# Patient Record
Sex: MALE | Race: WHITE | NOT HISPANIC OR LATINO | URBAN - METROPOLITAN AREA
[De-identification: names, ages, dates, MRNs, and addresses within clinical notes are randomized per-mention and may not be internally consistent; named-entity substitution may affect disease eponyms.]

---

## 2021-10-20 ENCOUNTER — OFFICE (OUTPATIENT)
Dept: URBAN - METROPOLITAN AREA CLINIC 34 | Facility: CLINIC | Age: 36
End: 2021-10-20

## 2021-10-20 VITALS
SYSTOLIC BLOOD PRESSURE: 106 MMHG | TEMPERATURE: 97.9 F | WEIGHT: 228 LBS | DIASTOLIC BLOOD PRESSURE: 83 MMHG | HEART RATE: 65 BPM | HEIGHT: 75 IN

## 2021-10-20 DIAGNOSIS — K21.9 GASTRO-ESOPHAGEAL REFLUX DISEASE WITHOUT ESOPHAGITIS: ICD-10-CM

## 2021-10-20 DIAGNOSIS — I34.1 NONRHEUMATIC MITRAL (VALVE) PROLAPSE: ICD-10-CM

## 2021-10-20 DIAGNOSIS — R13.10 DYSPHAGIA, UNSPECIFIED: ICD-10-CM

## 2021-10-20 PROCEDURE — 99204 OFFICE O/P NEW MOD 45 MIN: CPT | Performed by: PHYSICIAN ASSISTANT

## 2021-10-20 NOTE — SERVICEHPINOTES
ADAMS DURON   is a   35   year old white male who is being seen in consultation at the request of   EMY BEAN   for ov prior to EGD. He informs of prior EGDs done in [place] in 2011, 2014,  2016: Requesting records from Dr Kristie Fields in [facility]. His last EGD was in 2016 with esophageal dilation and h/o dysphagia. He reports recurrent dysphagia since end of 2019 and early 2020. He mentions since Winter of 2018 consistent use of rx Omeprazole 40 mg qd that he states has helped about "95%" of his chest discomfort. At this time he notes mild dysphagia, felt over mid sternal region, occurs about once every 2-3 months so avoids dry meats/rice. Denies chest pain, odynophagia, abdominal pain, change in BMs, melena, weight loss.  br
br
br

## 2023-05-30 ENCOUNTER — OFFICE (OUTPATIENT)
Dept: URBAN - METROPOLITAN AREA CLINIC 79 | Facility: CLINIC | Age: 38
End: 2023-05-30

## 2023-05-30 VITALS
WEIGHT: 237 LBS | DIASTOLIC BLOOD PRESSURE: 81 MMHG | SYSTOLIC BLOOD PRESSURE: 121 MMHG | HEIGHT: 75 IN | TEMPERATURE: 97.2 F | HEART RATE: 63 BPM

## 2023-05-30 DIAGNOSIS — R13.10 DYSPHAGIA, UNSPECIFIED: ICD-10-CM

## 2023-05-30 DIAGNOSIS — K21.9 GASTRO-ESOPHAGEAL REFLUX DISEASE WITHOUT ESOPHAGITIS: ICD-10-CM

## 2023-05-30 DIAGNOSIS — I34.1 NONRHEUMATIC MITRAL (VALVE) PROLAPSE: ICD-10-CM

## 2023-05-30 PROCEDURE — 99214 OFFICE O/P EST MOD 30 MIN: CPT | Performed by: PHYSICIAN ASSISTANT

## 2023-05-30 RX ORDER — OMEPRAZOLE 40 MG/1
CAPSULE, DELAYED RELEASE ORAL
Qty: 180 | Refills: 2 | Status: ACTIVE

## 2023-05-30 NOTE — SERVICEHPINOTES
ADAMS DURON   is a   37   year old white male who is being seen in consultation at the request of   EMY BEAN   for EGD w/ dilation. Last seen in 10/2021 for dysphagia and recommendation to have EGD which he did not do. He informs of prior EGDs done in [place] in 2011, 2014, 2016: Requesting records from Dr Kristie Fields in [facility]. His last EGD was in 2016 with esophageal dilation and h/o dysphagia. He reports recurrent dysphagia since end of 2019 and early 2020. He mentions since Winter of 2018 consistent use of rx Omeprazole 40 mg qd that he states has helped about "95%" of his chest discomfort. He recalls initialy mild dysphagia, felt over mid sternal region, occurs about once every 2-3 months but at this time events occur 1-2x/week. He notes "chicken" hard to pass down about 2 weeks ago at a Wedding so avoids dry meats/rice. He has h/o MVP No cardiac surgery. Not followed by cardiologist. Denies chest pain, odynophagia, abdominal pain, change in BMs, melena, weight loss.br

## 2023-06-07 ENCOUNTER — AMBULATORY SURGICAL CENTER (OUTPATIENT)
Dept: URBAN - METROPOLITAN AREA SURGERY 23 | Facility: SURGERY | Age: 38
End: 2023-06-07

## 2023-06-07 DIAGNOSIS — R13.10 DYSPHAGIA, UNSPECIFIED: ICD-10-CM

## 2023-06-07 PROCEDURE — 43450 DILATE ESOPHAGUS 1/MULT PASS: CPT | Performed by: INTERNAL MEDICINE

## 2023-06-07 PROCEDURE — 43239 EGD BIOPSY SINGLE/MULTIPLE: CPT | Performed by: INTERNAL MEDICINE

## 2023-09-27 ENCOUNTER — TELEHEALTH PROVIDED OTHER THAN IN PATIENT'S HOME (OUTPATIENT)
Dept: URBAN - METROPOLITAN AREA TELEHEALTH 12 | Facility: TELEHEALTH | Age: 38
End: 2023-09-27

## 2023-09-27 VITALS — HEIGHT: 75 IN | WEIGHT: 228 LBS

## 2023-09-27 DIAGNOSIS — K20.0 EOSINOPHILIC ESOPHAGITIS: ICD-10-CM

## 2023-09-27 DIAGNOSIS — R13.10 DYSPHAGIA, UNSPECIFIED: ICD-10-CM

## 2023-09-27 DIAGNOSIS — K21.9 GASTRO-ESOPHAGEAL REFLUX DISEASE WITHOUT ESOPHAGITIS: ICD-10-CM

## 2023-09-27 PROCEDURE — 99214 OFFICE O/P EST MOD 30 MIN: CPT | Mod: 95 | Performed by: INTERNAL MEDICINE

## 2023-09-27 NOTE — SERVICEHPINOTES
PATIENT VERIFIED BY DATE OF BIRTH AND NAME. Patient has been consented for this telecommunication visit.   38 yo WM underwent EGD with dilatation in 6/23 for Schatzki's ring , bx did show eosinophilic esophagitis. He is doing better, does note occ dysphagia if he eats too big a piece. He notes heartburn magdalena if he eats spicy or acidic but was able to eat steak last night. He has a hx of multiple EGD for dysphagia and dilatations. He does feel Omeprazole helps 95%of the chest discomfort, has cut down to qd.

## 2023-09-27 NOTE — SERVICENOTES
Patient's visit was conducted through [employer] video telecommunication. Patient consented before the start of visit as to understanding of privacy concerns, possible technological failure, and their responsibility of carrying out instructions of plan.